# Patient Record
Sex: MALE | Race: BLACK OR AFRICAN AMERICAN | Employment: UNEMPLOYED | URBAN - METROPOLITAN AREA
[De-identification: names, ages, dates, MRNs, and addresses within clinical notes are randomized per-mention and may not be internally consistent; named-entity substitution may affect disease eponyms.]

---

## 2019-11-12 ENCOUNTER — APPOINTMENT (OUTPATIENT)
Dept: GENERAL RADIOLOGY | Age: 29
End: 2019-11-12

## 2019-11-12 ENCOUNTER — HOSPITAL ENCOUNTER (EMERGENCY)
Age: 29
Discharge: HOME | End: 2019-11-12
Attending: EMERGENCY MEDICINE

## 2019-11-12 VITALS
OXYGEN SATURATION: 100 % | WEIGHT: 192 LBS | HEIGHT: 71 IN | BODY MASS INDEX: 26.88 KG/M2 | DIASTOLIC BLOOD PRESSURE: 67 MMHG | SYSTOLIC BLOOD PRESSURE: 105 MMHG | HEART RATE: 64 BPM | TEMPERATURE: 98.2 F | RESPIRATION RATE: 16 BRPM

## 2019-11-12 DIAGNOSIS — M77.8 TENDINITIS OF RIGHT SHOULDER: Primary | ICD-10-CM

## 2019-11-12 PROCEDURE — 73030 X-RAY EXAM OF SHOULDER: CPT

## 2019-11-12 PROCEDURE — 6360000002 HC RX W HCPCS: Performed by: STUDENT IN AN ORGANIZED HEALTH CARE EDUCATION/TRAINING PROGRAM

## 2019-11-12 PROCEDURE — 99283 EMERGENCY DEPT VISIT LOW MDM: CPT

## 2019-11-12 PROCEDURE — 96372 THER/PROPH/DIAG INJ SC/IM: CPT

## 2019-11-12 RX ORDER — IBUPROFEN 600 MG/1
600 TABLET ORAL EVERY 6 HOURS PRN
Qty: 30 TABLET | Refills: 0 | Status: ON HOLD | OUTPATIENT
Start: 2019-11-12 | End: 2021-12-09 | Stop reason: HOSPADM

## 2019-11-12 RX ORDER — KETOROLAC TROMETHAMINE 30 MG/ML
30 INJECTION, SOLUTION INTRAMUSCULAR; INTRAVENOUS ONCE
Status: COMPLETED | OUTPATIENT
Start: 2019-11-12 | End: 2019-11-12

## 2019-11-12 RX ADMIN — KETOROLAC TROMETHAMINE 30 MG: 30 INJECTION, SOLUTION INTRAMUSCULAR at 01:34

## 2019-11-12 ASSESSMENT — ENCOUNTER SYMPTOMS
DIARRHEA: 0
PHOTOPHOBIA: 0
WHEEZING: 0
ABDOMINAL PAIN: 0
NAUSEA: 0
VOMITING: 0
BACK PAIN: 0
COUGH: 0
SHORTNESS OF BREATH: 0
RHINORRHEA: 0

## 2019-11-12 ASSESSMENT — PAIN DESCRIPTION - DESCRIPTORS: DESCRIPTORS: THROBBING;SHARP

## 2019-11-12 ASSESSMENT — PAIN SCALES - GENERAL
PAINLEVEL_OUTOF10: 10
PAINLEVEL_OUTOF10: 10

## 2019-11-12 ASSESSMENT — PAIN DESCRIPTION - PAIN TYPE: TYPE: ACUTE PAIN

## 2019-11-12 ASSESSMENT — PAIN DESCRIPTION - FREQUENCY: FREQUENCY: CONTINUOUS

## 2019-11-12 ASSESSMENT — PAIN DESCRIPTION - ORIENTATION: ORIENTATION: RIGHT

## 2019-11-12 ASSESSMENT — PAIN DESCRIPTION - LOCATION: LOCATION: SHOULDER

## 2021-12-07 ENCOUNTER — HOSPITAL ENCOUNTER (INPATIENT)
Age: 31
LOS: 2 days | Discharge: HOME | DRG: 751 | End: 2021-12-09
Attending: EMERGENCY MEDICINE | Admitting: PSYCHIATRY & NEUROLOGY
Payer: MEDICAID

## 2021-12-07 DIAGNOSIS — F32.A DEPRESSION WITH SUICIDAL IDEATION: Primary | ICD-10-CM

## 2021-12-07 DIAGNOSIS — R45.851 DEPRESSION WITH SUICIDAL IDEATION: Primary | ICD-10-CM

## 2021-12-07 LAB
ACETAMINOPHEN LEVEL: <5 UG/ML (ref 10–30)
ALBUMIN SERPL-MCNC: 5.2 G/DL (ref 3.5–5.2)
ALBUMIN/GLOBULIN RATIO: NORMAL (ref 1–2.5)
ALP BLD-CCNC: 63 U/L (ref 40–129)
ALT SERPL-CCNC: 13 U/L (ref 5–41)
ANION GAP SERPL CALCULATED.3IONS-SCNC: 14 MMOL/L (ref 9–17)
AST SERPL-CCNC: 21 U/L
BILIRUB SERPL-MCNC: 0.8 MG/DL (ref 0.3–1.2)
BUN BLDV-MCNC: 12 MG/DL (ref 6–20)
BUN/CREAT BLD: NORMAL (ref 9–20)
CALCIUM SERPL-MCNC: 9.8 MG/DL (ref 8.6–10.4)
CHLORIDE BLD-SCNC: 101 MMOL/L (ref 98–107)
CO2: 25 MMOL/L (ref 20–31)
CREAT SERPL-MCNC: 1.07 MG/DL (ref 0.7–1.2)
ETHANOL PERCENT: <0.01 %
ETHANOL: <10 MG/DL
GFR AFRICAN AMERICAN: >60 ML/MIN
GFR NON-AFRICAN AMERICAN: >60 ML/MIN
GFR SERPL CREATININE-BSD FRML MDRD: NORMAL ML/MIN/{1.73_M2}
GFR SERPL CREATININE-BSD FRML MDRD: NORMAL ML/MIN/{1.73_M2}
GLUCOSE BLD-MCNC: 85 MG/DL (ref 70–99)
HCT VFR BLD CALC: 44.4 % (ref 41–53)
HEMOGLOBIN: 15.3 G/DL (ref 13.5–17.5)
MCH RBC QN AUTO: 31.6 PG (ref 26–34)
MCHC RBC AUTO-ENTMCNC: 34.4 G/DL (ref 31–37)
MCV RBC AUTO: 91.8 FL (ref 80–100)
NRBC AUTOMATED: NORMAL PER 100 WBC
PDW BLD-RTO: 12.6 % (ref 11.5–14.9)
PLATELET # BLD: 235 K/UL (ref 150–450)
PMV BLD AUTO: 9.2 FL (ref 6–12)
POTASSIUM SERPL-SCNC: 3.9 MMOL/L (ref 3.7–5.3)
RBC # BLD: 4.84 M/UL (ref 4.5–5.9)
SALICYLATE LEVEL: <1 MG/DL (ref 3–10)
SARS-COV-2, RAPID: NOT DETECTED
SODIUM BLD-SCNC: 140 MMOL/L (ref 135–144)
SPECIMEN DESCRIPTION: NORMAL
TOTAL PROTEIN: 8.2 G/DL (ref 6.4–8.3)
TOXIC TRICYCLIC SC,BLOOD: ABNORMAL
TSH SERPL DL<=0.05 MIU/L-ACNC: 0.35 MIU/L (ref 0.3–5)
WBC # BLD: 5.8 K/UL (ref 3.5–11)

## 2021-12-07 PROCEDURE — 87635 SARS-COV-2 COVID-19 AMP PRB: CPT

## 2021-12-07 PROCEDURE — G0480 DRUG TEST DEF 1-7 CLASSES: HCPCS

## 2021-12-07 PROCEDURE — 84443 ASSAY THYROID STIM HORMONE: CPT

## 2021-12-07 PROCEDURE — 85027 COMPLETE CBC AUTOMATED: CPT

## 2021-12-07 PROCEDURE — 99285 EMERGENCY DEPT VISIT HI MDM: CPT

## 2021-12-07 PROCEDURE — 80053 COMPREHEN METABOLIC PANEL: CPT

## 2021-12-07 PROCEDURE — 6370000000 HC RX 637 (ALT 250 FOR IP): Performed by: PSYCHIATRY & NEUROLOGY

## 2021-12-07 PROCEDURE — 80143 DRUG ASSAY ACETAMINOPHEN: CPT

## 2021-12-07 PROCEDURE — 1240000000 HC EMOTIONAL WELLNESS R&B

## 2021-12-07 PROCEDURE — 36415 COLL VENOUS BLD VENIPUNCTURE: CPT

## 2021-12-07 PROCEDURE — 80307 DRUG TEST PRSMV CHEM ANLYZR: CPT

## 2021-12-07 PROCEDURE — 80179 DRUG ASSAY SALICYLATE: CPT

## 2021-12-07 RX ORDER — TRAZODONE HYDROCHLORIDE 50 MG/1
50 TABLET ORAL NIGHTLY PRN
Status: DISCONTINUED | OUTPATIENT
Start: 2021-12-07 | End: 2021-12-09 | Stop reason: HOSPADM

## 2021-12-07 RX ORDER — MAGNESIUM HYDROXIDE/ALUMINUM HYDROXICE/SIMETHICONE 120; 1200; 1200 MG/30ML; MG/30ML; MG/30ML
30 SUSPENSION ORAL EVERY 6 HOURS PRN
Status: DISCONTINUED | OUTPATIENT
Start: 2021-12-07 | End: 2021-12-09 | Stop reason: HOSPADM

## 2021-12-07 RX ORDER — HALOPERIDOL 5 MG/ML
5 INJECTION INTRAMUSCULAR EVERY 4 HOURS PRN
Status: DISCONTINUED | OUTPATIENT
Start: 2021-12-07 | End: 2021-12-09 | Stop reason: HOSPADM

## 2021-12-07 RX ORDER — NICOTINE 21 MG/24HR
1 PATCH, TRANSDERMAL 24 HOURS TRANSDERMAL DAILY
Status: DISCONTINUED | OUTPATIENT
Start: 2021-12-07 | End: 2021-12-09 | Stop reason: HOSPADM

## 2021-12-07 RX ORDER — LORAZEPAM 2 MG/ML
2 INJECTION INTRAMUSCULAR EVERY 4 HOURS PRN
Status: DISCONTINUED | OUTPATIENT
Start: 2021-12-07 | End: 2021-12-09 | Stop reason: HOSPADM

## 2021-12-07 RX ORDER — HALOPERIDOL 5 MG
5 TABLET ORAL EVERY 4 HOURS PRN
Status: DISCONTINUED | OUTPATIENT
Start: 2021-12-07 | End: 2021-12-09 | Stop reason: HOSPADM

## 2021-12-07 RX ORDER — LORAZEPAM 1 MG/1
2 TABLET ORAL EVERY 4 HOURS PRN
Status: DISCONTINUED | OUTPATIENT
Start: 2021-12-07 | End: 2021-12-09 | Stop reason: HOSPADM

## 2021-12-07 RX ORDER — IBUPROFEN 400 MG/1
400 TABLET ORAL EVERY 6 HOURS PRN
Status: DISCONTINUED | OUTPATIENT
Start: 2021-12-07 | End: 2021-12-09 | Stop reason: HOSPADM

## 2021-12-07 RX ORDER — ACETAMINOPHEN 325 MG/1
650 TABLET ORAL EVERY 4 HOURS PRN
Status: DISCONTINUED | OUTPATIENT
Start: 2021-12-07 | End: 2021-12-09 | Stop reason: HOSPADM

## 2021-12-07 RX ORDER — POLYETHYLENE GLYCOL 3350 17 G/17G
17 POWDER, FOR SOLUTION ORAL DAILY PRN
Status: DISCONTINUED | OUTPATIENT
Start: 2021-12-07 | End: 2021-12-09 | Stop reason: HOSPADM

## 2021-12-07 RX ORDER — DIPHENHYDRAMINE HYDROCHLORIDE 50 MG/ML
50 INJECTION INTRAMUSCULAR; INTRAVENOUS EVERY 4 HOURS PRN
Status: DISCONTINUED | OUTPATIENT
Start: 2021-12-07 | End: 2021-12-09 | Stop reason: HOSPADM

## 2021-12-07 RX ORDER — HYDROXYZINE 50 MG/1
50 TABLET, FILM COATED ORAL 3 TIMES DAILY PRN
Status: DISCONTINUED | OUTPATIENT
Start: 2021-12-07 | End: 2021-12-09 | Stop reason: HOSPADM

## 2021-12-07 RX ADMIN — TRAZODONE HYDROCHLORIDE 50 MG: 50 TABLET ORAL at 21:18

## 2021-12-07 ASSESSMENT — SLEEP AND FATIGUE QUESTIONNAIRES
DO YOU HAVE DIFFICULTY SLEEPING: YES
AVERAGE NUMBER OF SLEEP HOURS: 6
RESTFUL SLEEP: NO
DO YOU HAVE DIFFICULTY SLEEPING: NO
DIFFICULTY ARISING: YES
SLEEP PATTERN: DISTURBED/INTERRUPTED SLEEP
DO YOU USE A SLEEP AID: NO
DIFFICULTY STAYING ASLEEP: YES
DIFFICULTY FALLING ASLEEP: NO

## 2021-12-07 ASSESSMENT — ENCOUNTER SYMPTOMS
COUGH: 0
FACIAL SWELLING: 0
ABDOMINAL PAIN: 0
EYE DISCHARGE: 0
CONSTIPATION: 0
COLOR CHANGE: 0
DIARRHEA: 0
NAUSEA: 0
CHEST TIGHTNESS: 0
VOMITING: 0
TROUBLE SWALLOWING: 0
WHEEZING: 0
SHORTNESS OF BREATH: 0
EYE REDNESS: 0
EYE PAIN: 0
SINUS PRESSURE: 0
SORE THROAT: 0
BACK PAIN: 0
RHINORRHEA: 0
BLOOD IN STOOL: 0

## 2021-12-07 ASSESSMENT — PATIENT HEALTH QUESTIONNAIRE - PHQ9: SUM OF ALL RESPONSES TO PHQ QUESTIONS 1-9: 4

## 2021-12-07 ASSESSMENT — LIFESTYLE VARIABLES: HISTORY_ALCOHOL_USE: NO

## 2021-12-07 ASSESSMENT — PAIN SCALES - GENERAL: PAINLEVEL_OUTOF10: 0

## 2021-12-07 NOTE — ED NOTES
Provisional Diagnosis:   Depression    Psychosocial and Contextual Factors:     Patient has limited support system. Patient has financial issues. C-SSRS Summary:      Patient: X  Family:   Agency: X-[employer]        Present Suicidal Behavior:  X    Verbal:  Patient reports suicidal ideations. Attempt:     Past Suicidal Behavior:      Verbal:     Attempt:      Substance Abuse: Patient denies. Self-Injurious/Self-Mutilation: Patient denies. Violence Current or Past:     Trauma Identified: Patient denies. Protective Factors:    Patient has housing. Risk Factors:    Patient has no insurance. Patient has no employment. Patient is not linked with mental health providers. Clinical Summary:    Patient is a 27year old male that is brought to the ED today via [employer] for suicidal ideations. [employer] officer states the patients girlfriend called the police after the patient made multiple suicidal statements. [employer] officers completed pink slip for this patient. [employer] officer reported the patient made multiple suicidal statements in front of the officer. Per pink slip the patient has become more aggressive over the past several weeks and the girlfriend did not feel safe with him. Patient is tearful upon arrival to the ED. Patient reports he felt overwhelmed and confused about the police being called. Patient states \"I finally talk about how I feel and then the police started knocking on my door. \" Patient reports feeling suicidal. Patient reports he did tell his girlfriend he wanted to kill himself. Patient states he still feels that \"everyone would be better if I kill myself. \"     Patient denies any previous mental health diagnosis or treatment. Patient does however state he has been \"struggling with stuff\" for a long time. Patient states that he feels \"alone and isolated. \" Patient states \"I have nobody. I can't talk to anyone.  I just hold it all in.\" Patient contributes much of his stressors to his relationship with his girlfriend. Patient reports he is from [place] but moved to [place] in 2019. Patient reports his family did not have a lot of money but that he lived in a small town and his family was close. Patient reports he moved to [place] with his girlfriend of 14 years because she has family here. Patient reports he has 3 children with his girlfriend and they live together at this time. Patient reports he does not get along with her family and they talk down to him a lot. Patient reports he has held multiple jobs since living in [place] but does not currently have employment. Patient denies any current legal issues. Patient denies drug or alcohol use. Level of Care Disposition:    Dr. Alida Denver consulted. Patient is accepted to the [facility] for safety and stabilization.

## 2021-12-07 NOTE — ED TRIAGE NOTES
Mode of arrival (squad #, walk in, police, etc) : Gregory LYN        Chief complaint(s): depression,         Arrival Note (brief scenario, treatment PTA, etc). : Pt has had depression, feels like he would be better off dead and he would not be a burden to anyone any more. C= \"Have you ever felt that you should Cut down on your drinking? \" no   A= \"Have people Annoyed you by criticizing your drinking? \" no  G= \"Have you ever felt bad or Guilty about your drinking? \"  no  E= \"Have you ever had a drink as an Eye-opener first thing in the morning to steady your nerves or to help a hangover? \"  no      Deferred []      Reason for deferring: na    *If yes to two or more: probable alcohol abuse. *

## 2021-12-07 NOTE — ED PROVIDER NOTES
[facility] ED  EMERGENCY DEPARTMENT ENCOUNTER      Pt Name: Jaja Villatoro  MRN: [id number]  [place] 1990  Date of evaluation: 12/7/21      CHIEF COMPLAINT       Chief Complaint   Patient presents with   [address]         HISTORY OF PRESENT ILLNESS    Jaja Villatoro is a 27 y.o. male who presents complaining of Psychiatric Evaluation. Patient was brought in by police after he told his family that he thinks that he would be better off dead. Patient states that for a long time he has been under a lot of stress and just feels like people do not think he is worth anything and that he would be better off dead. Patient states that he is never talked to anybody about this before and is not on any medications. Patient denies drug or alcohol abuse. Patient states that he does not necessarily have a plan. Patient denies any hallucinations. Patient was brought in on a pink slip by police. Patient has no somatic complaints. REVIEW OF SYSTEMS       Review of Systems   Constitutional: Negative for activity change, appetite change, chills, diaphoresis and fever. HENT: Negative for congestion, ear pain, facial swelling, nosebleeds, rhinorrhea, sinus pressure, sore throat and trouble swallowing. Eyes: Negative for pain, discharge and redness. Respiratory: Negative for cough, chest tightness, shortness of breath and wheezing. Cardiovascular: Negative for chest pain, palpitations and leg swelling. Gastrointestinal: Negative for abdominal pain, blood in stool, constipation, diarrhea, nausea and vomiting. Genitourinary: Negative for difficulty urinating, dysuria, flank pain, frequency, genital sores and hematuria. Musculoskeletal: Negative for arthralgias, back pain, gait problem, joint swelling, myalgias and neck pain. Skin: Negative for color change, pallor, rash and wound.    Neurological: Negative for dizziness, tremors, seizures, syncope, speech difficulty, weakness, numbness and headaches. Psychiatric/Behavioral: Positive for dysphoric mood and suicidal ideas. Negative for confusion, decreased concentration, hallucinations, self-injury and sleep disturbance. PAST MEDICAL HISTORY   History reviewed. No pertinent past medical history. SURGICAL HISTORY     History reviewed. No pertinent surgical history. CURRENT MEDICATIONS       Previous Medications    IBUPROFEN (ADVIL;MOTRIN) 600 MG TABLET    Take 1 tablet by mouth every 6 hours as needed for Pain       ALLERGIES     has No Known Allergies. SOCIAL HISTORY      reports that he has never smoked. He has never used smokeless tobacco. He reports previous alcohol use. He reports that he does not use drugs. PHYSICAL EXAM     INITIAL VITALS: BP (!) 125/94   Pulse 89   Temp 98.6 °F (37 °C) (Oral)   Resp 18   Ht 5' 11\" (1.803 m)   Wt 160 lb (72.6 kg)   SpO2 98%   BMI 22.32 kg/m²      Physical Exam  Constitutional:       General: He is not in acute distress. Appearance: He is well-developed. He is not diaphoretic. HENT:      Head: Normocephalic and atraumatic. Eyes:      General: No scleral icterus. Right eye: No discharge. Left eye: No discharge. Conjunctiva/sclera: Conjunctivae normal.      Pupils: Pupils are equal, round, and reactive to light. Cardiovascular:      Rate and Rhythm: Normal rate and regular rhythm. Heart sounds: Normal heart sounds. No murmur heard. No friction rub. No gallop. Pulmonary:      Effort: Pulmonary effort is normal. No respiratory distress. Breath sounds: Normal breath sounds. No wheezing or rales. Neurological:      Mental Status: He is alert and oriented to person, place, and time. Psychiatric:         Mood and Affect: Mood is depressed. Affect is tearful. Speech: Speech normal.         Behavior: Behavior is withdrawn. Thought Content: Thought content includes suicidal ideation.  Thought content does not include homicidal ideation. DIAGNOSTIC RESULTS     LABS: All lab results were reviewed by myself, and all abnormals are listed below. Labs Reviewed   TOX SCR, BLD, ED - Abnormal; Notable for the following components:       Result Value    Acetaminophen Level <5 (*)     Salicylate Lvl <1 (*)     All other components within normal limits   COVID-19, RAPID   CBC   COMPREHENSIVE METABOLIC PANEL   TSH WITH REFLEX   URINE DRUG SCREEN   DRUG SCREEN TRICYCLIC URINE         MEDICAL DECISION MAKING:     We will do the medical work-up and then patient should be admitted to the [facility] for further evaluation. EMERGENCY DEPARTMENT COURSE:   Vitals:    Vitals:    12/07/21 1508   BP: (!) 125/94   Pulse: 89   Resp: 18   Temp: 98.6 °F (37 °C)   TempSrc: Oral   SpO2: 98%   Weight: 160 lb (72.6 kg)   Height: 5' 11\" (1.803 m)       The patient was given the following medications while in the emergency department:  No orders of the defined types were placed in this encounter. -------------------------  4:53 PM EST  Patient is medically cleared and is ready for admission to the [facility] for further treatment and evaluation. FINAL IMPRESSION      1. Depression with suicidal ideation          DISPOSITION/PLAN   DISPOSITION Decision To Admit 12/07/2021 04:52:58 PM      PATIENT REFERREDTO:  No follow-up provider specified.     DISCHARGEMEDICATIONS:  New Prescriptions    No medications on file       (Please note that portions of this note were completed with a voice recognition program.  Efforts were made to edit thedictations but occasionally words are mis-transcribed.)    Jonnie Hinojosa MD  Attending Emergency Physician                     Jonnie Hinojosa MD  12/07/21 1076

## 2021-12-08 PROBLEM — F32.2 MDD (MAJOR DEPRESSIVE EPISODE), SINGLE EPISODE, SEVERE, NO PSYCHOSIS (HCC): Status: ACTIVE | Noted: 2021-12-08

## 2021-12-08 PROCEDURE — 6370000000 HC RX 637 (ALT 250 FOR IP): Performed by: NURSE PRACTITIONER

## 2021-12-08 PROCEDURE — 1240000000 HC EMOTIONAL WELLNESS R&B

## 2021-12-08 PROCEDURE — 6370000000 HC RX 637 (ALT 250 FOR IP): Performed by: PSYCHIATRY & NEUROLOGY

## 2021-12-08 PROCEDURE — 99223 1ST HOSP IP/OBS HIGH 75: CPT | Performed by: PSYCHIATRY & NEUROLOGY

## 2021-12-08 RX ORDER — MIRTAZAPINE 15 MG/1
7.5 TABLET, FILM COATED ORAL NIGHTLY
Status: DISCONTINUED | OUTPATIENT
Start: 2021-12-08 | End: 2021-12-09 | Stop reason: HOSPADM

## 2021-12-08 RX ADMIN — MIRTAZAPINE 7.5 MG: 15 TABLET, FILM COATED ORAL at 22:16

## 2021-12-08 RX ADMIN — TRAZODONE HYDROCHLORIDE 50 MG: 50 TABLET ORAL at 22:16

## 2021-12-08 NOTE — BH NOTE

## 2021-12-08 NOTE — GROUP NOTE
Group Therapy Note    Date: 12/8/2021    Group Start Time: 1100  Group End Time: 5459  Group Topic: Cognitive Skills    MILE Pat, CTRS        Group Therapy Note    Attendees: 8/16         Pt did not participate in Cognitive Skills Group at 1100am when encouraged by RT due to pacing in hallway throughout group time, pt brightens but declines to attend group when approached/invited several times. Pt was offered talk time as an alternative to group but declined.        Discipline Responsible: Psychoeducational Specialist      Signature:  Renato Walker

## 2021-12-08 NOTE — PROGRESS NOTES
Behavioral Services  Medicare Certification Upon Admission    I certify that this patient's inpatient psychiatric hospital admission is medically necessary for:    [x] (1) Treatment which could reasonably be expected to improve this patient's condition,       [x] (2) Or for diagnostic study;     AND     [x](2) The inpatient psychiatric services are provided while the individual is under the care of a physician and are included in the individualized plan of care.     Estimated length of stay/service -2-3 days    Plan for post-hospital care -outpatient care    Electronically signed by Jade Morales MD on 12/8/2021 at 1:33 PM

## 2021-12-08 NOTE — PLAN OF CARE
[address] [place] [address]  Initial Interdisciplinary Treatment Plan NOTE      Original treatment plan Date & Time: 12/8/2021                829AM  Admission Type:  Admission Type:  Involuntary    Reason for admission:   Reason for Admission: inability to care for self, altered mental state    Estimated Length of Stay:  5-7days  Estimated Discharge Date: to be determined by physician    PATIENT STRENGTHS:  Patient Strengths:Strengths: No significant Physical Illness  Patient Strengths and Limitations:Limitations: Difficult relationships / poor social skills, Difficulty problem solving/relies on others to help solve problems, Apathetic / unmotivated, Multiple barriers to leisure interests, Perceives need for assistance with self-care, External locus of control  Addictive Behavior: Addictive Behavior  In the past 3 months, have you felt or has someone told you that you have a problem with:  : None  Do you have a history of Chemical Use?: No  Do you have a history of Alcohol Use?: No  Do you have a history of Street Drug Abuse?: No  Histroy of Prescripton Drug Abuse?: No  Medical Problems:  Past Medical History:   Diagnosis Date    Blind right eye     Dental bridge present     partial, left lower    Hypertension     Strabismus     right eye    Wears glasses      Status EXAM:Status and Exam  Normal: No  Facial Expression: Flat  Affect: Blunt, Appropriate  Level of Consciousness: Alert  Mood:Normal: No  Mood: Depressed  Motor Activity:Normal: No  Motor Activity: Unusual Posture/Gait  Interview Behavior: Cooperative  Preception: [place] to Person, [place] to Place  Attention:Normal: Yes  Attention: Distractible  Thought Processes: Blocking, Circumstantial  Thought Content:Normal: No  Thought Content: Poverty of Content  Hallucinations: None  Delusions: No  Delusions:  (NA)  Memory:Normal: No  Memory: Poor Recent, Poor Remote  Insight and Judgment: No  Insight and Judgment: Unmotivated, Poor Insight, Poor Judgment  Present Suicidal Ideation: No  Present Homicidal Ideation: No    EDUCATION:   Learner Progress Toward Treatment Goals: reviewed group plans and strategies for care    Method:group therapy, medication compliance, individualized assessments and care planning    Outcome: needs reinforcement    PATIENT GOALS: to be discussed with patient within 72 hours    PLAN/TREATMENT RECOMMENDATIONS:     continue group therapy , medications compliance, goal setting, individualized assessments and care, continue to monitor pt on unit      SHORT-TERM GOALS:   Time frame for Short-Term Goals: 5-7 days    LONG-TERM GOALS:  Time frame for Long-Term Goals: 6 months  Members Present in Team Meeting: See Signature Sheet
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
## 2021-12-08 NOTE — PLAN OF CARE
Problem: Altered Mood, Depressive Behavior:  Goal: Able to verbalize and/or display a decrease in depressive symptoms  Description: Able to verbalize and/or display a decrease in depressive symptoms  12/8/2021 1208 by Isauro Delgado LPN  Outcome: Ongoing  12/8/2021 0829 by ANGEL Mckinnon  Outcome: Ongoing   Patient is working on verbalizing a decrease in depressive symptoms. Problem: Anger Management/Homicidal Ideation:  Goal: Ability to verbalize frustrations and anger appropriately will improve  Description: Ability to verbalize frustrations and anger appropriately will improve  12/8/2021 1208 by Isauro Delgado LPN  Outcome: Ongoing  12/8/2021 0829 by ANGEL Mckinnon  Outcome: Ongoing   Ability to verbalize frustrations and anger appropriately will improve.

## 2021-12-08 NOTE — BH NOTE
[facility]  Admission Note     Admission Type:   Admission Type: Involuntary    Reason for admission:  Reason for Admission: increased depression with suicidal ideation, no plan    PATIENT STRENGTHS:  Strengths: Communication, No significant Physical Illness, Social Skills, Motivated    Patient Strengths and Limitations:  Limitations: Tendency to isolate self, Lacks leisure interests, Multiple barriers to leisure interests    Addictive Behavior:   Addictive Behavior  In the past 3 months, have you felt or has someone told you that you have a problem with:  : None  Do you have a history of Chemical Use?: No  Do you have a history of Alcohol Use?: No  Do you have a history of Street Drug Abuse?: No  Histroy of Prescripton Drug Abuse?: No    Medical Problems:   History reviewed. No pertinent past medical history.     Status EXAM:  Status and Exam  Normal: No  Facial Expression: Elevated  Affect: Appropriate  Level of Consciousness: Alert  Mood:Normal: No  Mood: Anxious  Motor Activity:Normal: Yes  Interview Behavior: Cooperative  Preception: [place] to Person, Devonte Medicus to Time, [place] to Place, [place] to Situation  Attention:Normal: No  Attention: Hyperalert  Thought Processes: Circumstantial  Thought Content:Normal: No  Thought Content: Preoccupations  Hallucinations: None  Delusions: No  Memory:Normal: Yes  Insight and Judgment: No  Insight and Judgment: Poor Judgment  Present Suicidal Ideation: No  Present Homicidal Ideation: No    Tobacco Screening:  Practical Counseling, on admission, adelaida X, if applicable and completed (first 3 are required if patient doesn't refuse):            ( )  Recognizing danger situations (included triggers and roadblocks)                    ( )  Coping skills (new ways to manage stress, exercise, relaxation techniques, changing routine, distraction)                                                           ( )  Basic information about quitting (benefits of quitting, techniques in how to quit, available resources  ( ) Referral for counseling faxed to [facility]                                             ( x) Patient refused counseling  ( ) Patient has not smoked in the last 30 days    Metabolic Screening:    No results found for: LABA1C    No results found for: CHOL  No results found for: TRIG  No results found for: HDL  No components found for: LDLCAL  No results found for: LABVLDL      Body mass index is 22.32 kg/m². BP Readings from Last 2 Encounters:   12/07/21 117/78   11/12/19 105/67           Pt admitted with followings belongings:  Dental Appliances: Other (Comment)  Vision - Corrective Lenses: None  Hearing Aid: None  Jewelry: Bracelet  Body Piercings Removed: N/A  Clothing: Belt, Footwear, Jacket/Coat, Shirt, Shorts  Other Valuables: Money, Lighter/Matches (0.15 cents)     Patient's home medications were none. Patient oriented to surroundings and program expectations and copy of patient rights given. Received admission packet. Consents reviewed, signed . Refused voluntary. Patient verbalize understanding. Patient education on precautions. Pt [place] with g/f and kids. 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 Stressed out taking care of kids feels lonely no time to himself or anyone to talk to. No home meds, Denies SI on admit, states he was just [place] a moment\"    Love Slaughter RN

## 2021-12-08 NOTE — BH NOTE
Patient refused to attend morning goals group at 0900 after encouragement from staff. 1:1 talk time offered but refused.

## 2021-12-08 NOTE — BH NOTE
patient refused to attend wellness group at 1430 after encouragement from staff.   1:1 talk time provided as alternative to group session

## 2021-12-08 NOTE — GROUP NOTE
Group Therapy Note    Date: 12/8/2021    Group Start Time: 1000  Group End Time: 1030  Group Topic: Psychotherapy    MILE BELCHERI JOAQUIN Chaudhry        Group Therapy Note             Patient refused to attend psychotherapy group after encouragement from staff. 1:1 talk time offered but refused. Signature:   Patricia Chaudhry

## 2021-12-08 NOTE — CARE COORDINATION
BHI Biopsychosocial Assessment      Current Level of Psychosocial Functioning      Independent   Dependent    Minimal Assist X     Comments:  Client has a principle diagnosis of Depression with suicidal ideations, which is the condition established to be chiefly responsible for the admission of the client on this date. Psychosocial High-Risk Factors (check all that apply)     Unable to obtain meds   Chronic illness/pain    Not taking medications X  Substance abuse   Lack of Family Support X  Addictive Behaviors  Financial stress X  Isolation  Inadequate Community Resources X  Suicide attempt(s)   Homicidal ideations  Self-mutilation  Victim of crime   Legal issues  Developmental Delay  Unable to manage personal needs    Age 72 or older   Homeless  No transportation   Readmission within 30 days   Unemployment X  Traumatic Event X    Psychiatric Advanced Directives:   Nothing reported     Family to Involve in Treatment:  Family not involved in treatment. Mother listed as emergency contact, Rossi Worrell at [phone]     Sexual Orientation:   Single male     Patient Strengths:  Patient has housing with girlfriend. Patient Barriers:   Patient has no insurance. Patient has no employment. Patient is not linked with mental health providers. Patient has limited support system. Patient has financial issues. Patient reports suicidal ideations.       Opiate/AOD Referral and/or Education Provided:   No substance abuse     CMHC/mental health history:   Not currently linked, link new with [facility] of Care:  Medication management, group/individual therapies, family meetings, psychoeducation, 1:1 counseling, treatment team meetings to assist with stabilization     Safety Plan:  Writer initiates safety plan at time of assessment and will be reviewed again in a group setting     Initial Discharge Plan:  Stabilize mood and medications; explore social support systems within community to increase socialization; provide 24/7 local and national hotline numbers for additional support; safety plan to be completed; disclose/discuss suicidal ideas will improve, decrease depressive symptoms, absence of self-harm; link to [address] [place] [address], return to address on face-sheet, link to HELP for Medicaid     Clinical Summary per ED notes:   \"Patient is a 34-year old male that is brought to the ED today via [employer] for suicidal ideations. [employer] officer states the patients girlfriend called the police after the patient made multiple suicidal statements. [employer] officers completed pink slip for this patient. [employer] officer reported the patient made multiple suicidal statements in front of the officer. Per pink slip the patient has become more aggressive over the past several weeks and the girlfriend did not feel safe with him. Patient is tearful upon arrival to the ED. Patient reports he felt overwhelmed and confused about the police being called. Patient states \"I finally talk about how I feel and then the police started knocking on my door. \" Patient reports feeling suicidal. Patient reports he did tell his girlfriend he wanted to kill himself. Patient states he still feels that \"everyone would be better if I kill myself. \"      Patient denies any previous mental health diagnosis or treatment. Patient does however state he has been \"struggling with stuff\" for a long time. Patient states that he feels \"alone and isolated. \" Patient states \"I have nobody. I can't talk to anyone. I just hold it all in.\" Patient contributes much of his stressors to his relationship with his girlfriend. Patient reports he is from [place] but moved to [place] in 2019. Patient reports his family did not have a lot of money but that he lived in a small town and his family was close. Patient reports he moved to [place] with his girlfriend of 14 years because she has family here.  Patient reports he has 3 children with his girlfriend and they live together at this time. Patient reports he does not get along with her family and they talk down to him a lot. Patient reports he has held multiple jobs since living in [place] but does not currently have employment. Patient denies any current legal issues. Patient denies drug or alcohol use. \"

## 2021-12-09 VITALS
HEART RATE: 59 BPM | WEIGHT: 160 LBS | HEIGHT: 71 IN | BODY MASS INDEX: 22.4 KG/M2 | OXYGEN SATURATION: 98 % | DIASTOLIC BLOOD PRESSURE: 73 MMHG | RESPIRATION RATE: 14 BRPM | TEMPERATURE: 97.3 F | SYSTOLIC BLOOD PRESSURE: 140 MMHG

## 2021-12-09 PROCEDURE — 99239 HOSP IP/OBS DSCHRG MGMT >30: CPT | Performed by: PSYCHIATRY & NEUROLOGY

## 2021-12-09 RX ORDER — MIRTAZAPINE 15 MG/1
15 TABLET, FILM COATED ORAL NIGHTLY
Qty: 30 TABLET | Refills: 3 | Status: SHIPPED | OUTPATIENT
Start: 2021-12-09

## 2021-12-09 NOTE — GROUP NOTE
Group Therapy Note    Date: 2021    Group Start Time: 1430  Group End Time: 1743  Group Topic: Cognitive Skills    ANGEL Ruiz        Group Therapy Note    Attendees: 5/10         Patient's Goal:  ***    Notes:  ***    Status After Intervention:  {Status After Intervention:223130760}    Participation Level: {Participation Level:089185151}    Participation Quality: {[facility] PARTICIPATION QUALITY:083479477}      Speech:  {ED  CD_SPEECH:34187}      Thought Process/Content: {Thought Process/Content:181230527}      Affective Functioning: {Affective Functionin}      Mood: {Mood:245675444}      Level of consciousness:  {Level of consciousness:904629770}      Response to Learning: {[facility] Responses to Learnin}      Endings: {[facility] Endings:38817}    Modes of Intervention: {[facility] Modes of Intervention:329658742}      Discipline Responsible: {[facility] Multidisciplinary:531814729}      Signature:  [employer], [employer]

## 2021-12-09 NOTE — PLAN OF CARE
Problem: Anger Management/Homicidal Ideation:  Goal: Ability to verbalize frustrations and anger appropriately will improve  Description: Ability to verbalize frustrations and anger appropriately will improve  12/9/2021 0045 by Krish Michel  Outcome: Ongoing  No violent or negative behaviors noted at this time. Will cont to provide safe, calm, environment and use verbal de-escalation techniques when needed. Support and reassurance also given as needed. Problem: Altered Mood, Depressive Behavior:  Goal: Able to verbalize and/or display a decrease in depressive symptoms  Description: Able to verbalize and/or display a decrease in depressive symptoms  12/9/2021 0045 by Krish Michel  Outcome: Ongoing  Patient denies depression and does not present with depressive symptoms or behaviors. Problem: Altered Mood, Depressive Behavior:  Goal: Ability to disclose and discuss suicidal ideas will improve  Description: Ability to disclose and discuss suicidal ideas will improve  12/9/2021 0045 by Krish Michel  Outcome: Ongoing  Patient denies suicidal ideations. Agreeable to talking with staff if thoughts to harm self arise. Q15min checks maintained for safety.
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
## 2021-12-09 NOTE — GROUP NOTE
HS Goal Group     Date: December 8, 2021   Group Start Time: 2000   Group End Time: 2030   [facility] ADULT UNIT D   Attendees: 5/14     Group Topic:  Goal Group   Notes: Patient participated in  goal group. Status After Intervention: Improved     Participation Level: Active Listener and Interactive     Participation Quality: Appropriate, attentive and supportive     Speech: Normal     Thought Process/Content: Logical and linear     Affective Functioning: Congruent     Mood: WDL     Level of consciousness: Oriented x4     Response to Learning: Progressing to goal; able to verbalize current knowledge/experience, acknowledge new learning, retain information and change behavior Endings: None reported     Modes of Intervention: Education and exploration     Discipline Responsible: Behavioral Health Tech     Signature: VARSHA De   [facility] ADULT UNIT D   Attendees: 5/14     Group Topic:  Goal Group   Notes: Patient participated in  goal group. Status After Intervention: Improved     Participation Level:  Active Listener and Interactive     Participation Quality: Appropriate, attentive and supportive     Speech: Normal     Thought Process/Content: Logical and linear     Affective Functioning: Congruent     Mood: WDL     Level of consciousness: Oriented x4     Response to Learning: Progressing to goal; able to verbalize current knowledge/experience, acknowledge new learning, retain information and change behavior Endings: None reported     Modes of Intervention: Education and exploration     Discipline Responsible: Behavioral Health Tech   Signature: VARSHA De

## 2021-12-09 NOTE — GROUP NOTE
Group Therapy Note    Date: 12/9/2021    Group Start Time: 1100  Group End Time: 1150  Group Topic: Cognitive Skills    MILE Ambriz, CTRS        Group Therapy Note    Attendees: 4/14         Pt did not participate in Cognitive Skills Group at 1100am when encouraged by RT due to resting in room. Pt was offered talk time as an alternative to group but declined.          Discipline Responsible: Psychoeducational Specialist        Signature:  Emilia Blood

## 2021-12-09 NOTE — BH NOTE
[facility]  Discharge Note    Pt discharged with followings belongings:   Dental Appliances: Other (Comment)  Vision - Corrective Lenses: None  Hearing Aid: None  Jewelry: Bracelet  Body Piercings Removed: N/A  Clothing: Belt, Footwear, Jacket/Coat, Shirt, Shorts  Other Valuables: Money, Lighter/Matches (0.15 cents)   Valuables returned to patient. Patient education on aftercare instructions: yes, follow up appointments and medications  Information faxed to [facility] by nurse  at 4:14 PM .Patient verbalize understanding of AVS:  Yes. Discharged home to private residence.     Status EXAM upon discharge:  Status and Exam  Normal: Yes  Facial Expression: Brightened  Affect: Appropriate  Level of Consciousness: Alert  Mood:Normal: Yes  Mood: Anxious  Motor Activity:Normal: Yes  Interview Behavior: Cooperative  Preception: [place] to Person, [place] to Time, [place] to Place, [place] to Situation  Attention:Normal: Yes  Attention: Hyperalert  Thought Processes:  (WNL)  Thought Content:Normal: Yes  Thought Content: Preoccupations  Hallucinations: None  Delusions: No  Memory:Normal: Yes  Insight and Judgment: Yes  Insight and Judgment: Other(See comment) (WNL)  Present Suicidal Ideation: No  Present Homicidal Ideation: No      Metabolic Screening:    No results found for: LABA1C    No results found for: CHOL  No results found for: TRIG  No results found for: HDL  No components found for: LDLCAL  No results found for: Deondre Tabor LPN